# Patient Record
Sex: MALE | Race: WHITE | Employment: FULL TIME | URBAN - METROPOLITAN AREA
[De-identification: names, ages, dates, MRNs, and addresses within clinical notes are randomized per-mention and may not be internally consistent; named-entity substitution may affect disease eponyms.]

---

## 2018-12-18 ENCOUNTER — OFFICE VISIT (OUTPATIENT)
Dept: FAMILY MEDICINE CLINIC | Age: 23
End: 2018-12-18
Payer: OTHER GOVERNMENT

## 2018-12-18 VITALS
HEIGHT: 73 IN | DIASTOLIC BLOOD PRESSURE: 80 MMHG | WEIGHT: 272 LBS | SYSTOLIC BLOOD PRESSURE: 120 MMHG | BODY MASS INDEX: 36.05 KG/M2

## 2018-12-18 DIAGNOSIS — B00.89 HERPES GLADIATORUM: Primary | ICD-10-CM

## 2018-12-18 PROCEDURE — 99213 OFFICE O/P EST LOW 20 MIN: CPT | Performed by: PHYSICIAN ASSISTANT

## 2018-12-18 RX ORDER — VALACYCLOVIR HYDROCHLORIDE 500 MG/1
1000 TABLET, FILM COATED ORAL 2 TIMES DAILY
Qty: 28 TABLET | Refills: 0 | Status: SHIPPED | OUTPATIENT
Start: 2018-12-18 | End: 2018-12-25

## 2018-12-18 ASSESSMENT — PATIENT HEALTH QUESTIONNAIRE - PHQ9
SUM OF ALL RESPONSES TO PHQ QUESTIONS 1-9: 0
DEPRESSION UNABLE TO ASSESS: FUNCTIONAL CAPACITY MOTIVATION LIMITS ACCURACY
2. FEELING DOWN, DEPRESSED OR HOPELESS: 0
SUM OF ALL RESPONSES TO PHQ QUESTIONS 1-9: 0
SUM OF ALL RESPONSES TO PHQ9 QUESTIONS 1 & 2: 0
1. LITTLE INTEREST OR PLEASURE IN DOING THINGS: 0

## 2018-12-18 ASSESSMENT — ENCOUNTER SYMPTOMS
SHORTNESS OF BREATH: 0
SORE THROAT: 0

## 2019-06-13 ENCOUNTER — HOSPITAL ENCOUNTER (EMERGENCY)
Age: 24
Discharge: HOME | End: 2019-06-13
Payer: OTHER GOVERNMENT

## 2019-06-13 VITALS
BODY MASS INDEX: 33.37 KG/M2 | WEIGHT: 260 LBS | TEMPERATURE: 98 F | HEART RATE: 65 BPM | RESPIRATION RATE: 16 BRPM | DIASTOLIC BLOOD PRESSURE: 94 MMHG | OXYGEN SATURATION: 98 % | HEIGHT: 74 IN | SYSTOLIC BLOOD PRESSURE: 157 MMHG

## 2019-06-13 DIAGNOSIS — L08.9 SKIN INFECTION: ICD-10-CM

## 2019-06-13 DIAGNOSIS — L25.5 CONTACT DERMATITIS DUE TO PLANT: Primary | ICD-10-CM

## 2019-06-13 DIAGNOSIS — R03.0 ELEVATED BLOOD PRESSURE READING: ICD-10-CM

## 2019-06-13 PROCEDURE — 99282 EMERGENCY DEPT VISIT SF MDM: CPT

## 2019-06-13 RX ORDER — PREDNISONE 20 MG/1
40 TABLET ORAL DAILY
Qty: 10 TABLET | Refills: 0 | Status: SHIPPED | OUTPATIENT
Start: 2019-06-13 | End: 2019-06-18

## 2019-06-13 RX ORDER — HYDROXYZINE HYDROCHLORIDE 25 MG/1
25 TABLET, FILM COATED ORAL 3 TIMES DAILY PRN
Qty: 20 TABLET | Refills: 0 | Status: SHIPPED | OUTPATIENT
Start: 2019-06-13 | End: 2019-06-23

## 2019-06-13 RX ORDER — CEPHALEXIN 500 MG/1
1000 CAPSULE ORAL 2 TIMES DAILY
Qty: 28 CAPSULE | Refills: 0 | Status: SHIPPED | OUTPATIENT
Start: 2019-06-13 | End: 2019-06-20

## 2019-06-13 ASSESSMENT — ENCOUNTER SYMPTOMS
VOMITING: 0
THROAT SWELLING: 0
SHORTNESS OF BREATH: 0

## 2019-06-13 NOTE — ED PROVIDER NOTES
Evaluated by FRANKI supervising physician available for consult    Patient had been working outside clearing brush and started with itchy rash to arms and legs Saturday, 5 days ago and has tried calamine lotion and ivy dry without improvement and the area on his left forearm has gotten larger in size and increased redness with drainage and concern for infection. No fever. No vomiting. The history is provided by the patient. Rash   Location:  Shoulder/arm and leg  Shoulder/arm rash location:  R forearm, L forearm, R upper arm and L upper arm  Leg rash location:  R leg and L leg  Quality: draining, itchiness, redness and swelling    Onset quality:  Gradual  Duration:  5 days  Progression:  Worsening  Chronicity:  New  Context: plant contact    Associated symptoms: no fever, no shortness of breath, no throat swelling and not vomiting        Review of Systems   Constitutional: Negative for chills and fever. Respiratory: Negative for shortness of breath. Cardiovascular: Negative for chest pain. Gastrointestinal: Negative for vomiting. Skin: Positive for rash. Neurological: Negative for numbness. PAST MEDICAL HISTORY  denies    PAST SURGICAL HISTORY   has a past surgical history that includes Tonsillectomy; fracture surgery; and Dental surgery. FAMILY HISTORY  family history includes Arthritis in his maternal grandmother; Cancer in his paternal aunt and paternal grandfather; Diabetes in his paternal grandmother; Early Death in his paternal aunt and paternal grandfather; Heart Disease in his maternal grandfather. SOCIAL HISTORY   reports that he has never smoked. His smokeless tobacco use includes chew. He reports that he drinks alcohol. He reports that he does not use drugs. HOME MEDICATIONS     none    ALLERGIES  has No Known Allergies.      BP (!) 157/94   Pulse 65   Temp 98 °F (36.7 °C) (Oral)   Resp 16   Ht 6' 2\" (1.88 m)   Wt 260 lb (117.9 kg)   SpO2 98%   BMI 33.38 kg/m² Physical Exam   Constitutional: He is oriented to person, place, and time. He appears well-developed and well-nourished. Non-toxic appearance. HENT:   Head: Normocephalic and atraumatic. Mouth/Throat: Uvula is midline, oropharynx is clear and moist and mucous membranes are normal. No posterior oropharyngeal edema or posterior oropharyngeal erythema. Eyes: Conjunctivae are normal.   Neck: Normal range of motion. Cardiovascular: Normal rate, regular rhythm, normal heart sounds and intact distal pulses. Pulmonary/Chest: Effort normal and breath sounds normal. No stridor. No respiratory distress. He has no wheezes. He has no rales. Neurological: He is alert and oriented to person, place, and time. No sensory deficit. He exhibits normal muscle tone. Skin: Skin is warm and dry. Rash noted. No petechiae and no purpura noted. Rash is papular. There is erythema. Psychiatric: He has a normal mood and affect. His behavior is normal.   Vitals reviewed. Procedures    MDM  Number of Diagnoses or Management Options  Contact dermatitis due to plant:   Elevated blood pressure reading:   Skin infection:   Patient Progress  Patient progress: stable         3:10 PM  Impression contact dermatitis to plant likely poison ivy with early skin infection left forearm. Treatment prednisone, atarax and keflex. Continue calamine drying agent and have recheck with pcp 1 week patient advised returning toER for any worsening. He understands and agrees. I estimate there is LOW risk for SEVERE CELLULITIS, SEPSIS OR NECROTIZING FASCIITIS,  thus I consider the discharge disposition reasonable. Please note that this chart was generated using Dragon dictation software.  Although every effort was made to ensure the accuracy of this automated transcription, some errors in transcription may have occurred       Sylvia Stark PA-C  06/13/19 [id number]

## 2020-05-19 ENCOUNTER — TELEPHONE (OUTPATIENT)
Dept: FAMILY MEDICINE CLINIC | Age: 25
End: 2020-05-19

## 2020-05-19 ENCOUNTER — NURSE TRIAGE (OUTPATIENT)
Dept: OTHER | Facility: CLINIC | Age: 25
End: 2020-05-19

## 2020-05-19 ENCOUNTER — HOSPITAL ENCOUNTER (EMERGENCY)
Age: 25
Discharge: HOME | End: 2020-05-19
Payer: OTHER GOVERNMENT

## 2020-05-19 VITALS
HEIGHT: 73 IN | BODY MASS INDEX: 35.12 KG/M2 | OXYGEN SATURATION: 96 % | RESPIRATION RATE: 16 BRPM | HEART RATE: 61 BPM | WEIGHT: 265 LBS | SYSTOLIC BLOOD PRESSURE: 151 MMHG | DIASTOLIC BLOOD PRESSURE: 87 MMHG | TEMPERATURE: 97.9 F

## 2020-05-19 PROCEDURE — 99282 EMERGENCY DEPT VISIT SF MDM: CPT

## 2020-05-19 RX ORDER — ERYTHROMYCIN 5 MG/G
OINTMENT OPHTHALMIC
Qty: 1 G | Refills: 0 | Status: SHIPPED | OUTPATIENT
Start: 2020-05-19

## 2020-05-19 ASSESSMENT — PAIN SCALES - GENERAL: PAINLEVEL_OUTOF10: 3

## 2020-05-19 ASSESSMENT — VISUAL ACUITY
OD: 20/10
OU: 20/10
OS: 20/10

## 2020-05-19 ASSESSMENT — PAIN DESCRIPTION - PAIN TYPE: TYPE: ACUTE PAIN

## 2020-05-19 ASSESSMENT — PAIN DESCRIPTION - ORIENTATION: ORIENTATION: LEFT

## 2020-05-19 ASSESSMENT — PAIN DESCRIPTION - LOCATION: LOCATION: EYE

## 2020-05-19 NOTE — TELEPHONE ENCOUNTER
[facility] received a call from:      Name of Caller: Onesimo Lang    Relationship to patient: Mother    Organization name:     Best contact number: [phone]     Reason for call: Mom states pt spoke with [employer] and they will not cover a VV, so mother would like to cancel today's appt. Pt had been checked in for appt already, spoke with Mynor Richardson and advised.